# Patient Record
(demographics unavailable — no encounter records)

---

## 2024-11-11 NOTE — ASSESSMENT
[FreeTextEntry1] : 1. HTN Continue amlodipine 5mg qd  2. HLD Simvastatin 20mg  3. Thyroid nodules Followed by Endo: Deshawn   Already received flu vaccine Labs next visit Follow-up 3 to 4 months

## 2024-11-11 NOTE — PHYSICAL EXAM
[No Acute Distress] : no acute distress [Well Nourished] : well nourished [Well Developed] : well developed [Well-Appearing] : well-appearing [Normal Sclera/Conjunctiva] : normal sclera/conjunctiva [No Lymphadenopathy] : no lymphadenopathy [Thyroid Normal, No Nodules] : the thyroid was normal and there were no nodules present [No Respiratory Distress] : no respiratory distress  [No Accessory Muscle Use] : no accessory muscle use [Clear to Auscultation] : lungs were clear to auscultation bilaterally [Normal Rate] : normal rate  [Regular Rhythm] : with a regular rhythm [Normal S1, S2] : normal S1 and S2 [No Murmur] : no murmur heard [No Edema] : there was no peripheral edema [Normal Posterior Cervical Nodes] : no posterior cervical lymphadenopathy [Normal Gait] : normal gait [Alert and Oriented x3] : oriented to person, place, and time

## 2024-11-11 NOTE — HISTORY OF PRESENT ILLNESS
[FreeTextEntry1] : f/u  [de-identified] : Alta is here today for follow-up.  She has been feeling well overall.  She did recently have an ablation for chronic back pain.  Following that, developed some significant pain and muscle spasm.  She is transiently on cyclobenzaprine and gabapentin.  She is appointment with her pain management doctor next month.  No other acute concerns

## 2024-12-03 NOTE — REASON FOR VISIT
[Initial Visit ___] : an initial visit for [unfilled] [Questionnaire Received] : Patient questionnaire received [Urine Frequency] : urine frequency [Urinary Urgency] : urinary urgency [Nocturia] : nocturia [________] : [unfilled] [Poor/Slow Urine Flow] : poor/slow urine flow [Cannot Empty Bladder] : cannot empty bladder [Pelvic Organ Prolapse] : pelvic organ prolapse [Problems With Defecation] : problems with defecation [Sexual Dysfunction] : sexual dysfunction

## 2024-12-03 NOTE — PHYSICAL EXAM
[Chaperone Present] : A chaperone was present in the examining room during all aspects of the physical examination [No Acute Distress] : in no acute distress [Oriented x3] : oriented to person, place, and time [Tenderness] : ~T no ~M abdominal tenderness observed [Distended] : not distended [None] : no CVA tenderness [Labia Majora] : were normal [Labia Minora] : were normal [Bartholin's Gland] : both Bartholin's glands were normal  [Normal Appearance] : general appearance was normal [No Bleeding] : there was no active vaginal bleeding [2] : 2 [Aa ____] : Aa [unfilled] [Ba ____] : Ba [unfilled] [C ____] : C [unfilled] [GH ____] : GH [unfilled] [PB ____] : PB [unfilled] [TVL ____] : TVL  [unfilled] [Ap ____] : Ap [unfilled] [Bp ____] : Bp [unfilled] [D ____] : D [unfilled] [] : II [Normal] : normal [Soft] :  the cervix was soft [Post Void Residual ____ml] : post void residual was [unfilled] ml [Exam Deferred] : was deferred [FreeTextEntry3] : empty supine CST neg [FreeTextEntry4] : no lesion/mass/cyst [de-identified] : checked standing as well [de-identified] : nontender, no appreciable mass

## 2024-12-03 NOTE — HISTORY OF PRESENT ILLNESS
[FreeTextEntry1] : Feels bladder protrusion/tissue from the vagina for years. Went Gyn who examined her and saw POP. No vaginal bleeding. Worse when constipated. No UI, but urinary frequency with less sensation or urgency vs prior, therefore she goes freq to avoid accidents. No dysuria, incomplete emptying, no hematuria. No intervention for these issues as of yet. Has purposefully lost over 50 lbs recently with ozempic use as she had been pre-DM. Nocturia 3 however she has insomnia and doesn't actually sleep much so not often that she wakes from being asleep to void. Reduces PO intake after dinner and prior to bed.  24 hour VD: 22v0L, intake 115 oz in --> reports she stayed home to perform this and therefore probably drank more than usual  HgA1c 24- 5.4% paper records 2024: UA neg rbcs, H/H 12/39, BUN/Cr 14/0.8, AST/ALT 15/13, GFR 82  OB:  x 2 PMH: constipation, HTN, HLD PSH: endometrial ablation NKDA

## 2024-12-03 NOTE — ADDENDUM
[FreeTextEntry1] : This note was written by Xiomara Mckeon, acting as the  for Dr. Ang. This note accurately reflects the work and decisions made by Dr. Ang.

## 2024-12-03 NOTE — PROCEDURE
[Straight Catheterization] : insertion of a straight catheter [Urinary Retention] : urinary retention [Urinary Frequency] : urinary frequency [Patient] : the patient [___ Fr Straight Tip] : a [unfilled] in [place] straight tip catheter [None] : there were no complications with the catheter insertion [Clear] : clear [No Complications] : no complications [Tolerated Well] : the patient tolerated the procedure well [Post procedure instructions and information given] : Post procedure instructions and information were given and reviewed with patient. [1] : 1 [FreeTextEntry1] : cathed to obtain pvr and uncontam specimen

## 2024-12-03 NOTE — DISCUSSION/SUMMARY
[FreeTextEntry1] : REYNA is a 69 year female who presents for On exam, negative CST, normal PVR, no POP.     [] []   f/u   All questions answered.

## 2024-12-03 NOTE — PHYSICAL EXAM
[Chaperone Present] : A chaperone was present in the examining room during all aspects of the physical examination [No Acute Distress] : in no acute distress [Oriented x3] : oriented to person, place, and time [Tenderness] : ~T no ~M abdominal tenderness observed [Distended] : not distended [None] : no CVA tenderness [Labia Majora] : were normal [Labia Minora] : were normal [Bartholin's Gland] : both Bartholin's glands were normal  [Normal Appearance] : general appearance was normal [No Bleeding] : there was no active vaginal bleeding [2] : 2 [Aa ____] : Aa [unfilled] [Ba ____] : Ba [unfilled] [C ____] : C [unfilled] [GH ____] : GH [unfilled] [PB ____] : PB [unfilled] [TVL ____] : TVL  [unfilled] [Ap ____] : Ap [unfilled] [Bp ____] : Bp [unfilled] [D ____] : D [unfilled] [] : II [Normal] : normal [Soft] :  the cervix was soft [Post Void Residual ____ml] : post void residual was [unfilled] ml [Exam Deferred] : was deferred [FreeTextEntry3] : empty supine CST neg [FreeTextEntry4] : no lesion/mass/cyst [de-identified] : checked standing as well [de-identified] : nontender, no appreciable mass

## 2024-12-03 NOTE — ASSESSMENT
[FreeTextEntry1] : 70 yo P2 with symptomatic POP. On exam, CST was neg,  ml, and POPQ revealed stage II anterior vaginal wall POP. She was checked standing as well, and had atrophic changes with urethral caruncle as well. The patient has pelvic organ prolapse. Management options including observation, kegels with or without PT, biofeedback, pessary, and surgery were reviewed. Pessary care was reviewed. Surg options obliterative vs reconstructive, major vs minor, abd / robotic vs vaginal, with or without hysterectomy, with or without graft use discussed. I feel the freq may be related to the POP and PO intake partially, which was reviewed. I also suspect today's exam may not be exhibiting the full extend of her POP (apical) therefore she will be rechecked. All ques answered.  Plan: [] UDS - check for OSUI  [] PRA - check POPQ first after day on feet, anticipate EUA / AR / cystoscopy / possible MUS vs robotic approach

## 2024-12-03 NOTE — ASSESSMENT
[FreeTextEntry1] : 68 yo P2 with symptomatic POP. On exam, CST was neg,  ml, and POPQ revealed stage II anterior vaginal wall POP. She was checked standing as well, and had atrophic changes with urethral caruncle as well. The patient has pelvic organ prolapse. Management options including observation, kegels with or without PT, biofeedback, pessary, and surgery were reviewed. Pessary care was reviewed. Surg options obliterative vs reconstructive, major vs minor, abd / robotic vs vaginal, with or without hysterectomy, with or without graft use discussed. I feel the freq may be related to the POP and PO intake partially, which was reviewed. I also suspect today's exam may not be exhibiting the full extend of her POP (apical) therefore she will be rechecked. All ques answered.  Plan: [] UDS - check for OSUI  [] PRA - check POPQ first after day on feet, anticipate EUA / AR / cystoscopy / possible MUS vs robotic approach

## 2024-12-03 NOTE — PROCEDURE
[Straight Catheterization] : insertion of a straight catheter [Urinary Retention] : urinary retention [Urinary Frequency] : urinary frequency [Patient] : the patient [___ Fr Straight Tip] : a [unfilled] in [place] straight tip catheter [None] : there were no complications with the catheter insertion [Clear] : clear [No Complications] : no complications [Tolerated Well] : the patient tolerated the procedure well [Post procedure instructions and information given] : Post procedure instructions and information were given and reviewed with patient. [1] : 1 [FreeTextEntry1] : cathed to obtain pvr and uncontam specimen

## 2025-03-04 NOTE — PHYSICAL EXAM
[Chaperone Present] : A chaperone was present in the examining room during all aspects of the physical examination [No Acute Distress] : in no acute distress [Oriented x3] : oriented to person, place, and time [Tenderness] : ~T no ~M abdominal tenderness observed [Distended] : not distended [None] : no CVA tenderness [Aa ____] : Aa [unfilled] [Ba ____] : Ba [unfilled] [C ____] : C [unfilled] [GH ____] : GH [unfilled] [PB ____] : PB [unfilled] [TVL ____] : TVL  [unfilled] [Ap ____] : Ap [unfilled] [Bp ____] : Bp [unfilled] [D ____] : D [unfilled] [] : II [de-identified] : lack of significant uterine descent c/w initial POPQ exam

## 2025-03-04 NOTE — ASSESSMENT
[FreeTextEntry1] : Stage II POP without apical descent - POPQ repeated today.  The patient has pelvic organ prolapse. Management options including observation, kegels with or without PT, biofeedback, pessary, and surgery were reviewed. Pessary care was reviewed. Surg options obliterative vs reconstructive, major vs minor, abd / robotic vs vaginal, with or without hysterectomy, with or without graft use discussed. Surgically, specifically, APR discussed due to lack of uterine descent, however risk of future uterine descent also reviewed. She is unsure how bothered she is by the POP and whether or not she wants to proceed with surgical correction for now. RTO for pessary fitting - trial, and see how much POP reduction may or may not help symptoms/makes her feel. All ques answered.  Plan: [] RTO for initial pessary fitting with a NP

## 2025-03-04 NOTE — HISTORY OF PRESENT ILLNESS
[FreeTextEntry1] : 68 yo P2 with symptomatic POP. On initial Urogyn exam, CST was neg,  ml, and POPQ revealed stage II anterior vaginal wall POP without apical descent which was checked standing as well. She was interested in surgical intervention but now not certain - unsure how bothered she is by the POP. She doesn't feel like it is down as much this morn as it gets at times. No vaginal bleeding, no paiin, no UI. UDS results and risk of OSUI with or without POP reduced discussed.  UDS: PVR normal at end, capacity 480 ml, no DO, no GSUI. --> discussed  HgA1c 24- 5.4% paper records 2024: UA neg rbcs, H/H 12/39, BUN/Cr 14/0.8, AST/ALT 15/13, GFR 82  OB:  x 2 PMH: constipation, HTN, HLD PSH: endometrial ablation NKDA

## 2025-03-04 NOTE — PHYSICAL EXAM
[Chaperone Present] : A chaperone was present in the examining room during all aspects of the physical examination [No Acute Distress] : in no acute distress [Oriented x3] : oriented to person, place, and time [Tenderness] : ~T no ~M abdominal tenderness observed [Distended] : not distended [None] : no CVA tenderness [Aa ____] : Aa [unfilled] [Ba ____] : Ba [unfilled] [C ____] : C [unfilled] [GH ____] : GH [unfilled] [PB ____] : PB [unfilled] [TVL ____] : TVL  [unfilled] [Ap ____] : Ap [unfilled] [Bp ____] : Bp [unfilled] [D ____] : D [unfilled] [] : II [de-identified] : lack of significant uterine descent c/w initial POPQ exam

## 2025-03-17 NOTE — REVIEW OF SYSTEMS
[Fever] : no fever [Chills] : no chills [Night Sweats] : no night sweats [Chest Pain] : no chest pain [Palpitations] : no palpitations [Shortness Of Breath] : no shortness of breath [Wheezing] : no wheezing [Cough] : no cough [Abdominal Pain] : no abdominal pain [Nausea] : nausea [Constipation] : constipation [Diarrhea] : diarrhea [Vomiting] : vomiting

## 2025-03-17 NOTE — HISTORY OF PRESENT ILLNESS
[FreeTextEntry1] : f/u  [de-identified] : Alta is here today for follow-up.  She recently saw her endocrinologist increase from Ozempic to 2 mg.  She took her last dose on Wednesday.  She then developed nausea and vomiting on Thursday.  Vomiting was nonbloody.  Volume was minor quantity.  She had some nausea on Thursday and Friday.  Had 1 other episode of vomiting after that.  No overt abdominal pain.  No diarrhea.  She is constipated and this has been a chronic issue for her.  No other acute concerns.

## 2025-03-17 NOTE — ASSESSMENT
[FreeTextEntry1] : 1. HTN Continue amlodipine 5mg qd  2. HLD Simvastatin 20mg  3. Thyroid nodules Followed by Suni: Deshawn  4.  Nausea/vomiting Suspect more from higher Ozempic dose.  Cannot exclude a viral gastroenteritis.  She is afebrile with a reassuring abdominal exam today.  She will continue to monitor.  Will skip next Ozempic dose and resume the following week.  If symptoms recur, will aim to go back down to lower dose with her endocrinologist. For constipation, will add daily Colace to see if it helps  Follow-up for CPE

## 2025-04-30 NOTE — ADDENDUM
[FreeTextEntry1] : This note was written by Julia Tong, acting as the  for Dr. Ang. This note accurately reflects the work and decisions made by Dr. Ang.

## 2025-04-30 NOTE — PHYSICAL EXAM
[MA] : MA [FreeTextEntry2] : Alondra [No Acute Distress] : in no acute distress [Oriented x3] : oriented to person, place, and time [Aa ____] : Aa [unfilled] [C ____] : C [unfilled] [TVL ____] : TVL  [unfilled] [Ap ____] : Ap [unfilled] [de-identified] : unchanged

## 2025-04-30 NOTE — PHYSICAL EXAM
[MA] : MA [FreeTextEntry2] : Alondra [No Acute Distress] : in no acute distress [Oriented x3] : oriented to person, place, and time [Aa ____] : Aa [unfilled] [C ____] : C [unfilled] [TVL ____] : TVL  [unfilled] [Ap ____] : Ap [unfilled] [de-identified] : unchanged

## 2025-04-30 NOTE — HISTORY OF PRESENT ILLNESS
[FreeTextEntry1] : REYNA is a 70 year female who presents for f/u on POP. Last seen on 2025, on exam stage II POP without apical descent noted on exam. Plan was for pessary fitting. She was originally interested in surgery but then not sure since prolapse not very bothersome to her then. Here to discuss options. She does not have UI. Still suffers with constipation and incomplete evac as well - has failed Linzess and Miralax, Sennekot helps.  UDS 2025: PVR normal at end, capacity 480 ml, no DO, no GSUI. --> discussed  HgA1c 24- 5.4% paper records 2024: UA neg rbcs, H/H 12/39, BUN/Cr 14/0.8, AST/ALT 15/13, GFR 82  OB:  x 2 PMH: constipation, HTN, HLD PSH: endometrial ablation NKDA

## 2025-04-30 NOTE — DISCUSSION/SUMMARY
[FreeTextEntry1] : REYNA is a 70 year female who presents for f/u on POP. She has stage II POP without apical descent -checked twice. Here to discuss options.   The patient has pelvic organ prolapse. Management options including observation, kegels with or without PT, biofeedback, pessary, and surgery were reviewed. Pessary care was reviewed. Surg options obliterative vs reconstructive, major vs minor, abd / robotic vs vaginal, with or without hysterectomy, with or without graft use discussed.  Plan: [] Book EUA / APR / perineorrhaphy / cystoscopy / other indicated procedures (we reviewed risk of future apical POP) [] Bowel regimen - d/w GI or PMD [] PRA   All questions answered.

## 2025-07-08 NOTE — ASSESSMENT
[FreeTextEntry1] : Stage II AP POP.  Obliterative vs reconstructive surgery discussed, minor vs major procedures discussed. Abdominal versus vaginal routes of surgery were reviewed. Abdominal surgery via robotic laparoscopy vs laparotomy discussed. Surgery with or without hysterectomy discussed. Surgery with or without graft use discussed. Efficacies, risks, and benefits reviewed.   Abdominally, a supracervical hysterectomy plus sacral colpopexy was discussed. Vaginally, a total hysterectomy plus uterosacral ligament suspension or sacrospinous fixation was discussed. A XAVI versus total hysterectomy was discussed. With XAVI, risk of cervical cancer in the future and need for routine pap smear surveillance was discussed. Benefits of XAVI including decreased risk of mesh exposure was discussed. Bilateral salpingectomy was discussed to decrease the future risk of ovarian cancer. The benefit of oophorectomy to decrease the future risk of ovarian cancer or need for future ovarian surgery was discussed. The protective benefits of retaining ovaries for bone and cardiac health as well as decreased all-cause mortality rate was discussed.   The risks and benefits of surgery were discussed and included: risks of general anesthesia, MI, stroke, cardiopulmonary arrest, infection, abscess formation, bleeding, hematoma formation, hemorrhage, transfusion, blood clot formation, fistula formation, rejection, dyspareunia, chronic pain, neuropathy, damage to surrounding structures including but not limited to bowel/ureters/bladder/rectum/nerves/vessels, leakage of urine, voiding dysfunction, OAB, urinary retention, procedure failure, recurrence, need for further surgery, and going home with a catheter. With the use of mesh, risk of mesh erosion or exposure discussed. The FDA warning on transvaginally placed mesh for prolapse correction versus abdominally placed mesh or transvaginally placed mesh for midurethral sling was reviewed. Website for the FDA provided for information as desired. A pelvic exam under anesthesia was discussed and consented to as well. Perioperative care, anesthesia, NPO prior to the surgery, and postop precautions were reviewed. The presence of learners in the OR was discussed. All questions were answered. She understood and would like to proceed. Consent was signed and witnessed in the office.  Plan: [] consent for EUA / APR / perineorrhaphy / cystoscopy / other indicated procedures  [] Bowel regimen - d/w GI or PMD

## 2025-07-08 NOTE — HISTORY OF PRESENT ILLNESS
[FreeTextEntry1] : Patient with stage II POP without apical descent noted on exam checked twice. She is symptomatic. She desires surg management and declines nonsurg intervention. She does not have UI. Still suffers with constipation and incomplete evac as well - has failed Linzess and Miralax. POP surg to treat POP and not urinary symptoms reviewed. Risk of future apical uterine descent without major procedure performed reviewed. Lack of current apical POP reviewed. Risk of OSUI and pros/cons to MUS at time of surg discussed. She is now on Linzess and a probiotic per GI whom she has seen recently. It has been 1 week and finally helping soften the stool caliber.  UDS 2025: PVR normal at end, capacity 480 ml, no DO, no GSUI.  HgA1c 24- 5.4% paper records 2024: UA neg rbcs, H/H 12/39, BUN/Cr 14/0.8, AST/ALT 15/13, GFR 82  OB:  x 2 PMH: constipation, HTN, HLD PSH: endometrial ablation NKDA